# Patient Record
Sex: MALE | Race: ASIAN | NOT HISPANIC OR LATINO | URBAN - METROPOLITAN AREA
[De-identification: names, ages, dates, MRNs, and addresses within clinical notes are randomized per-mention and may not be internally consistent; named-entity substitution may affect disease eponyms.]

---

## 2018-07-12 ENCOUNTER — EMERGENCY (EMERGENCY)
Facility: HOSPITAL | Age: 28
LOS: 0 days | Discharge: HOME | End: 2018-07-12
Attending: EMERGENCY MEDICINE | Admitting: EMERGENCY MEDICINE

## 2018-07-12 VITALS
TEMPERATURE: 99 F | DIASTOLIC BLOOD PRESSURE: 74 MMHG | HEART RATE: 71 BPM | RESPIRATION RATE: 18 BRPM | SYSTOLIC BLOOD PRESSURE: 131 MMHG | OXYGEN SATURATION: 100 %

## 2018-07-12 DIAGNOSIS — F17.200 NICOTINE DEPENDENCE, UNSPECIFIED, UNCOMPLICATED: ICD-10-CM

## 2018-07-12 DIAGNOSIS — F41.9 ANXIETY DISORDER, UNSPECIFIED: ICD-10-CM

## 2018-07-12 NOTE — ED PROVIDER NOTE - PHYSICAL EXAMINATION
VITAL SIGNS: I have reviewed nursing notes and confirm.  CONSTITUTIONAL: Well-developed; well-nourished; in no acute distress.  SKIN: Skin exam is warm and dry, no acute rash.  HEAD: Normocephalic; atraumatic.  EYES: PERRL, EOM intact; conjunctiva and sclera clear.  ENT: No nasal discharge; airway clear. TMs clear.  NECK: Supple; non tender.  CARD:  Regular rate and rhythm.  RESP: No wheezes, rales or rhonchi.  ABD: Normal bowel sounds; soft; non-distended; non-tender  EXT: Normal ROM. no calf pain, swelling or edema  NEURO: Alert, oriented. Grossly unremarkable. No focal deficits.  PSYCH: Cooperative, appropriate, no hallucinations, SI, HI, psychosis

## 2018-07-12 NOTE — ED PROVIDER NOTE - OBJECTIVE STATEMENT
28 yo M with no significant history, + daily smoker, here for assessment of 3 months of every other day episodes of palpitations, shortness of breath and anxiety. Sx always around 11pm, lasting 1 hour, self limited. No associated CP, fever, chills, LE swelling.     No recent travel, sick contacts.

## 2018-07-12 NOTE — ED ADULT NURSE NOTE - OBJECTIVE STATEMENT
26 y/o male presents to the ED c/o dizziness & numbness to B/L feet x 1 hr and difficulty taking a deep breath time 2-3 months. Pt is a current every day smoker. Denies HA, n/v, CP, abd pain, or any other c/o

## 2018-07-12 NOTE — ED PROVIDER NOTE - NS ED ROS FT
Constitutional: no fever, chills, no recent weight loss, change in appetite or malaise  Cardiac: see HPI  Respiratory: No cough or respiratory distress  GI: No nausea, vomiting, diarrhea or abdominal pain.  : No dysuria, frequency, urgency or hematuria  MS: no pain to back or extremities, no loss of ROM, no weakness  Neuro: No headache or weakness. No LOC.  Skin: No skin rash.  Psych: see HPI  Except as documented in the HPI, all other systems are negative.

## 2018-07-12 NOTE — ED PROVIDER NOTE - MEDICAL DECISION MAKING DETAILS
Patient here for episodic anxiety with palpitations, dyspnea -- sx have been going on for months, always self limited -- exam and VS normal, clear lungs, XR negative -- currently asymptomatic, no indication for emergent psych assessment, sx not suggestive of primary pulmonary process, PE, PNA, PTX.     Will dc with medicine follow up, psych follow up.

## 2019-06-11 ENCOUNTER — EMERGENCY (EMERGENCY)
Facility: HOSPITAL | Age: 29
LOS: 1 days | Discharge: HOME | End: 2019-06-11
Payer: SUBSIDIZED

## 2019-06-11 VITALS
OXYGEN SATURATION: 98 % | TEMPERATURE: 98 F | DIASTOLIC BLOOD PRESSURE: 69 MMHG | RESPIRATION RATE: 20 BRPM | SYSTOLIC BLOOD PRESSURE: 123 MMHG | HEART RATE: 75 BPM
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
## 2019-06-11 DIAGNOSIS — S20.362A INSECT BITE (NONVENOMOUS) OF LEFT FRONT WALL OF THORAX, INITIAL ENCOUNTER: ICD-10-CM

## 2019-06-11 DIAGNOSIS — Z79.899 OTHER LONG TERM (CURRENT) DRUG THERAPY: ICD-10-CM
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
## 2019-06-11 PROCEDURE — 99283 EMERGENCY DEPT VISIT LOW MDM: CPT

## 2019-06-11 NOTE — ED ADULT NURSE NOTE - OBJECTIVE STATEMENT
came in c/o insect bite to his chest 2 days ago when he went to woods in [place]. He physically take the tick out . Alert,oriented

## 2019-06-11 NOTE — ED ADULT TRIAGE NOTE - CHIEF COMPLAINT QUOTE
pt presents with tic bit which he says he felt 2 days ago and pulled the tic off. pt does not have the tic with him

## 2019-06-11 NOTE — ED PROVIDER NOTE - OBJECTIVE STATEMENT
29 y/o M with no PMH presents to ED for evaluation of insect bite to L chest that occurred 2 days ago while in [place] in the woods. At the time pt noted tick to left chest, later that night noted site to be engorged, removed tick on own. Now notes area of induration, concerned tick might still be there so came to ED for evaluation. No fever/chills, HA joint pain, rash, CP, SOB, abdominal pain, N/V/D.

## 2019-06-11 NOTE — ED PROVIDER NOTE - NS ED ROS FT
Constitutional: See HPI. No fever/chills. +Tick bite 2 days ago.   Neck: No neck pain or stiffness.  Cardiac: No chest pain, SOB or edema. No chest pain with exertion.  Respiratory: No cough or respiratory distress. No hemoptysis.   GI: No nausea, vomiting, diarrhea or abdominal pain.  : No dysuria, frequency or burning.   MS: No myalgia, muscle weakness, joint pain or back pain.  Neuro: No headache or weakness. No LOC.  Skin: +tick bite 2 days ago.   Endocrine: No history of thyroid disease or diabetes.  Except as documented in the HPI, all other systems are negative.

## 2019-06-11 NOTE — ED PROVIDER NOTE - CARE PROVIDER_API CALL
Khalif Al (DO)  Infectious Disease; Internal Medicine  [address] [place] [address]  Phone: [phone]  Fax: [phone]  Follow Up Time: 1-3 Days

## 2019-06-11 NOTE — ED PROVIDER NOTE - PHYSICAL EXAMINATION
VITAL SIGNS: I have reviewed nursing notes and confirm.  CONSTITUTIONAL: Well-developed; well-nourished; in no acute distress.  SKIN: Skin exam is warm and dry. Left chest wall with small area of erythema and induration 1cm in diameter. No tick noted upon exploration.   HEAD: Normocephalic; atraumatic.  ENT: MMM.   NECK: Supple; non tender.  CARD: S1, S2 normal; no murmurs, gallops, or rubs. Regular rate and rhythm.  RESP: No wheezes, rales or rhonchi.  ABD: soft, NT/ND.  LYMPH: No acute cervical adenopathy.  NEURO: Alert, oriented. Grossly unremarkable. No focal deficits.  PSYCH: Cooperative, appropriate.

## 2019-06-11 NOTE — ED PROVIDER NOTE - NSFOLLOWUPCLINICS_GEN_ALL_ED_FT
[facility]  Infectious Disease  [address] [place] [id number]  Phone: [phone]  Fax:   Follow Up Time: 1-3 Days

## 2019-06-11 NOTE — ED PROVIDER NOTE - CLINICAL SUMMARY MEDICAL DECISION MAKING FREE TEXT BOX
Tick bite 2 days ago. Pt removed tick himself. Will give Doxycycline and ID work up. Tick bite 2 days ago. Pt removed tick himself. Will give Doxycycline and ID work up. I have discussed the discharge plan with the patient. The patient agrees with the plan, as discussed.  The patient understands Emergency Department diagnosis is a preliminary diagnosis often based on limited information and that the patient must adhere to the follow-up plan as discussed.  The patient understands that if the symptoms worsen or if prescribed medications do not have the desired/planned effect that the patient may return to the Emergency Department at any time for further evaluation and treatment.

## 2019-07-24 ENCOUNTER — EMERGENCY (EMERGENCY)
Facility: HOSPITAL | Age: 29
LOS: 0 days | Discharge: HOME | End: 2019-07-24
Admitting: EMERGENCY MEDICINE
Payer: SUBSIDIZED

## 2019-07-24 VITALS
DIASTOLIC BLOOD PRESSURE: 68 MMHG | RESPIRATION RATE: 18 BRPM | SYSTOLIC BLOOD PRESSURE: 122 MMHG | HEART RATE: 67 BPM | TEMPERATURE: 98 F | OXYGEN SATURATION: 100 %

## 2019-07-24 VITALS — WEIGHT: 147.05 LBS | HEIGHT: 67 IN
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
## 2019-07-24 DIAGNOSIS — Z79.891 LONG TERM (CURRENT) USE OF OPIATE ANALGESIC: ICD-10-CM

## 2019-07-24 DIAGNOSIS — M54.9 DORSALGIA, UNSPECIFIED: ICD-10-CM
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
## 2019-07-24 DIAGNOSIS — F17.200 NICOTINE DEPENDENCE, UNSPECIFIED, UNCOMPLICATED: ICD-10-CM

## 2019-07-24 PROCEDURE — 99283 EMERGENCY DEPT VISIT LOW MDM: CPT

## 2019-07-24 RX ORDER — METHOCARBAMOL 500 MG/1
2 TABLET, FILM COATED ORAL
Qty: 30 | Refills: 0
Start: 2019-07-24 | End: 2019-07-28

## 2019-07-24 RX ORDER — KETOROLAC TROMETHAMINE 30 MG/ML
30 SYRINGE (ML) INJECTION ONCE
Refills: 0 | Status: DISCONTINUED | OUTPATIENT
Start: 2019-07-24 | End: 2019-07-24

## 2019-07-24 RX ORDER — METHOCARBAMOL 500 MG/1
1000 TABLET, FILM COATED ORAL ONCE
Refills: 0 | Status: COMPLETED | OUTPATIENT
Start: 2019-07-24 | End: 2019-07-24

## 2019-07-24 RX ADMIN — METHOCARBAMOL 1000 MILLIGRAM(S): 500 TABLET, FILM COATED ORAL at 20:39

## 2019-07-24 RX ADMIN — Medication 30 MILLIGRAM(S): at 20:37

## 2019-07-24 NOTE — ED PROVIDER NOTE - OBJECTIVE STATEMENT
29 yo male with no significant pmh presents to the ED c/o upper/mid back pain and tightness x several days, worse with movement. 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 Patient admits to similar pain in past which resolved on its own. Patient took 2 ibuprofen with minimal improvement. Pain worse on right side, nonradiating. Denies fever, chills, chest pain, sob, abd pain, N/V, UE/LE weakness or paresthesias. Denies recent travel/trauma/surgery, calf pain/swelling, h/o DVT/PE, or hormone use. Pain not pleuritic in nature.

## 2019-07-24 NOTE — ED PROVIDER NOTE - NSFOLLOWUPCLINICS_GEN_ALL_ED_FT
[facility] ([facility])  Rehabilitation  [facility] [address], [address] [place] [address]  Phone: [phone]  Fax:   Follow Up Time: 1-3 Days

## 2019-07-24 NOTE — ED PROVIDER NOTE - NS ED ROS FT
Constitutional: no fever, chills or generalized weakness  Cardiovascular: no chest pain, no sob, no syncope   Respiratory: no cough, no shortness of breath,  Gastrointestinal: no nausea, vomiting or diarrhea. no abdominal pain.   Musculoskeletal: see HPI.   Integumentary: no rash or skin changes. no edema  Neurological: no headache, no dizziness, no visual changes, no UE/LE weakness or paresthesias

## 2019-07-24 NOTE — ED PROVIDER NOTE - CLINICAL SUMMARY MEDICAL DECISION MAKING FREE TEXT BOX
Patient here for back pain after lifting heavy bags, + TTP right mid paraspinal muscles. recommend  nsaids, muscle relaxant, heat/ice, follow up with rehab as needed.

## 2019-07-24 NOTE — ED PROVIDER NOTE - PHYSICAL EXAMINATION
GENERAL:  well appearing, non-toxic patient in no acute distress  SKIN: skin warm, pink and dry. MMM. no rash or skin color changes to back. cap refill < 2 sec   PULM: CTAB. Normal respiratory effort. No respiratory distress. No wheezes, stridor, rales or rhonchi. No retractions  CV: RRR, no M/R/G.   ABD: Soft, non-tender, non-distended. No rebound or guarding. No CVA tenderness.  MSK: FROM of all extremities.  + right sided mid back paraspinal tenderness and tightness, no midline vertebral tenderness. FROM of back. normal ambulation. No edema, erythema, cyanosis. dp pulses equal and intact bilaterally.   NEURO: A+Ox3, no sensory/motor deficits  PSYCH: appropriate behavior, cooperative.

## 2022-10-30 NOTE — ED PROVIDER NOTE - CHILD ABUSE FACILITY
[facility] normal... Well appearing, awake, alert, oriented to person, place, time/situation and in no apparent distress.

## 2022-11-22 NOTE — ED ADULT NURSE NOTE - CCCP TRG CHIEF CMPLNT
bite, insect Metronidazole Counseling:  I discussed with the patient the risks of metronidazole including but not limited to seizures, nausea/vomiting, a metallic taste in the mouth, nausea/vomiting and severe allergy.

## 2025-03-28 NOTE — ED ADULT NURSE NOTE - CAS DISCH ACCOMP BY
[de-identified] : 27 
Self
normal (ped)...

## 2025-03-31 NOTE — ED ADULT NURSE NOTE - NS_BH TRG QUESTION2_ED_ALL_ED
Lab Services on 03/12/2019   Component Date Value Ref Range Status   • B12 03/12/2019 363  211 - 911 pg/mL Final   • VITAMIND, 25 HYDROXY 03/12/2019 12.1* 30.0 - 100.0 ng/mL Final    Comment: <20  ng/mL=Vitamin D deficiency  20-29  ng/mL=Vitamin D insufficiency   ng/mL=Optimal Vitamin D  >150 ng/mL=Possible toxicity     • PSA, Total 03/12/2019 0.66  <4.01 ng/mL Final    Comment:    SUGGESTED AGE SPECIFIC REFERENCE RANGES     AGE                NG/ML  40-49              <=2.50  50-59              <=3.50  60-69              <=4.50  70-79              <=6.50     REFERENCE: JOURNAL OF UROLOGY 155, [id number]     [employer] Chemiluminescence     • TSH 03/12/2019 1.298  0.350 - 5.000 mcUnits/mL Final    Comment: Findings most consistent with euthyroid state, no additional testing suggested. TSH may be normal in patients with thyroid dysfunction and pituitary disease. Clinical correlation recommended.  (Reflex TSH algorithm is not recommended in hospitalized patients. A variety of drugs, as well as serious acute and chronic illnesses may alter thyroid function tests. Commonly implicated drugs include glucocorticoids, dopamine, carbamazepine, iodine,   amiodarone, lithium and heparin.)     • FASTING STATUS 03/12/2019 13  hrs Final   • CHOLESTEROL 03/12/2019 243* <200 mg/dL Final    Comment:    Desirable            <200  Borderline High      200 to 239  High                 >=240        • CALCULATED LDL 03/12/2019 151* <130 mg/dL Final    Comment: OPTIMAL               <100  NEAR OPTIMAL          100-129  BORDERLINE HIGH       130-159  HIGH                  160-189  VERY HIGH             >=190     • HDL 03/12/2019 51  >39 mg/dL Final    Comment: Low            <40  Borderline Low 40 to 49  Near Optimal   50 to 59  Optimal        >=60     • TRIGLYCERIDE 03/12/2019 203* <150 mg/dL Final    Comment: Normal                   <150  Borderline High          150 to 199  High                     200 to 499  Very High                 >=500     • CALCULATED NON HDL 03/12/2019 192  mg/dL Final    Comment:    Therapeutic Target:  CHD and risk equivalents <130  Multiple risk factors    <160  0 to 1 risk factors      <190        • CHOL/HDL 03/12/2019 4.8* <4.5 Final   • Hemoglobin A1C 03/12/2019 5.0  4.5 - 5.6 % Final    Comment:    ----DIABETIC SCREENING---  NON DIABETIC                 <5.7%  INCREASED RISK                5.7-6.4%  DIAGNOSTIC FOR DIABETES      >6.4%     ----DIABETIC CONTROL---     A1C%           eAG mg/dL  6.0            126  6.5            140  7.0            154  7.5            169  8.0            183  8.5            197  9.0            212  9.5            226  10.0           240     • Fasting Status 03/12/2019 13  hrs Final   • Sodium 03/12/2019 140  135 - 145 mmol/L Final   • Potassium 03/12/2019 4.3  3.4 - 5.1 mmol/L Final   • Chloride 03/12/2019 107  98 - 107 mmol/L Final   • Carbon Dioxide 03/12/2019 28  21 - 32 mmol/L Final   • Anion Gap 03/12/2019 9* 10 - 20 mmol/L Final   • Glucose 03/12/2019 88  65 - 99 mg/dL Final   • BUN 03/12/2019 13  6 - 20 mg/dL Final   • Creatinine 03/12/2019 0.96  0.67 - 1.17 mg/dL Final   • GFR Estimate,  03/12/2019 >90   Final    eGFR results = or >90 mL/min/1.73m2 = Normal kidney function.   • GFR Estimate, Non  03/12/2019 >90   Final    eGFR results = or >90 mL/min/1.73m2 = Normal kidney function.   • BUN/Creatinine Ratio 03/12/2019 14  7 - 25 Final   • CALCIUM 03/12/2019 9.2  8.4 - 10.2 mg/dL Final   • TOTAL BILIRUBIN 03/12/2019 1.3* 0.2 - 1.0 mg/dL Final   • AST/SGOT 03/12/2019 19  <38 Units/L Final   • ALT/SGPT 03/12/2019 36  <79 Units/L Final   • ALK PHOSPHATASE 03/12/2019 97  45 - 117 Units/L Final   • TOTAL PROTEIN 03/12/2019 7.1  6.4 - 8.2 g/dL Final   • Albumin 03/12/2019 4.1  3.6 - 5.1 g/dL Final   • GLOBULIN 03/12/2019 3.0  2.0 - 4.0 g/dL Final   • A/G Ratio, Serum 03/12/2019 1.4  1.0 - 2.4 Final   • WBC 03/12/2019 8.6  4.2 - 11.0 K/mcL Final    • RBC 03/12/2019 5.51  4.50 - 5.90 mil/mcL Final   • HGB 03/12/2019 16.1  13.0 - 17.0 g/dL Final   • HCT 03/12/2019 49.1  39.0 - 51.0 % Final   • MCV 03/12/2019 89.1  78.0 - 100.0 fl Final   • MCH 03/12/2019 29.2  26.0 - 34.0 pg Final   • MCHC 03/12/2019 32.8  32.0 - 36.5 g/dL Final   • RDW-CV 03/12/2019 12.8  11.0 - 15.0 % Final   • PLT 03/12/2019 223  140 - 450 K/mcL Final   • NRBC 03/12/2019 0  0 /100 WBC Final   • DIFF TYPE 03/12/2019 AUTOMATED DIFFERENTIAL   Final   • Neutrophil 03/12/2019 64  % Final   • LYMPH 03/12/2019 22  % Final   • MONO 03/12/2019 8  % Final   • EOSIN 03/12/2019 4  % Final   • BASO 03/12/2019 1  % Final   • Percent Immature Granuloctyes 03/12/2019 1  % Final   • Absolute Neutrophil 03/12/2019 5.6  1.8 - 7.7 K/mcL Final   • Absolute Lymph 03/12/2019 1.9  1.0 - 4.8 K/mcL Final   • Absolute Mono 03/12/2019 0.7  0.3 - 0.9 K/mcL Final   • Absolute Eos 03/12/2019 0.4  0.1 - 0.5 K/mcL Final   • Absolute Baso 03/12/2019 0.0  0.0 - 0.3 K/mcL Final   • Absolute Immature Granulocytes 03/12/2019 0.1  0 - 0.2 K/mcl Final      No